# Patient Record
Sex: MALE | Race: WHITE
[De-identification: names, ages, dates, MRNs, and addresses within clinical notes are randomized per-mention and may not be internally consistent; named-entity substitution may affect disease eponyms.]

---

## 2018-06-13 ENCOUNTER — HOSPITAL ENCOUNTER (OUTPATIENT)
Dept: HOSPITAL 5 - GIO | Age: 73
Discharge: HOME | End: 2018-06-13
Attending: INTERNAL MEDICINE
Payer: MEDICARE

## 2018-06-13 VITALS — DIASTOLIC BLOOD PRESSURE: 67 MMHG | SYSTOLIC BLOOD PRESSURE: 127 MMHG

## 2018-06-13 DIAGNOSIS — Z99.89: ICD-10-CM

## 2018-06-13 DIAGNOSIS — I10: ICD-10-CM

## 2018-06-13 DIAGNOSIS — B96.81: ICD-10-CM

## 2018-06-13 DIAGNOSIS — Z85.528: ICD-10-CM

## 2018-06-13 DIAGNOSIS — Z90.5: ICD-10-CM

## 2018-06-13 DIAGNOSIS — K31.7: Primary | ICD-10-CM

## 2018-06-13 DIAGNOSIS — I25.10: ICD-10-CM

## 2018-06-13 DIAGNOSIS — K76.0: ICD-10-CM

## 2018-06-13 PROCEDURE — 43251 EGD REMOVE LESION SNARE: CPT

## 2018-06-13 PROCEDURE — 88342 IMHCHEM/IMCYTCHM 1ST ANTB: CPT

## 2018-06-13 PROCEDURE — 88305 TISSUE EXAM BY PATHOLOGIST: CPT

## 2018-06-13 NOTE — ANESTHESIA CONSULTATION
Anesthesia Consult and Med Hx


Date of service: 06/13/18





- Airway


Anesthetic Teeth Evaluation: Good


ROM Head & Neck: Adequate


Mental/Hyoid Distance: Adequate


Mallampati Class: Class III


Intubation Access Assessment: Possibly Difficult





- Pre-Operative Health Status


ASA Pre-Surgery Classification: ASA3


Proposed Anesthetic Plan: MAC





- Pulmonary


Hx Sleep Apnea: Yes (uses CPAP)





- Cardiovascular System


Hx Hypertension: Yes


Hx Coronary Artery Disease: No (high cholesterol)





- Endocrine


Hx Renal Disease: Yes (s/p right kidney resection for CA)

## 2018-06-13 NOTE — OPERATIVE REPORT
Operative Report


Operative Report: 


Esophagogastroduodenoscopy Procedure Note with Snare Polypectomy





Date of procedure: 6/13/2018





Endoscopist: Tommy Raza





Pre-op diagnosis: Gastric polyps





Post-op diagnosis: multiple gastric polyps in antrum removed 





Anesthesia: MAC





Complications: No immediate complications





Estimated blood loss: minimal





Procedure:  After consent was obtained, the patient was placed in the left 

lateral decubitus position.  The fujinon endoscope was inserted into the patient

's mouth under direct vision, and advanced to the 2nd portion of duodenum 

without difficulty.  The patient tolerated the procedure well.  The views of 

the mucosa were good.  Patient's vital signs were monitored continuously 

throughout the procedure.





Findings:





The esophagus appeared normal.





There were multiple gastric polyps in the antrum of the stomach.  The polyps 

ranged in size from 5-12 mm.  The polyps were removed with hot snare polypectomy

; larger polyps were not retrieved however (prior biopsies showed hyperplastic 

polyps)





The duodenum appeared normal.





Impression:


1. Multiple gastric polyps removed with hot snare polypectomy





Recommendations:


-follow-up pathology


-surveillance EGD in 6-12 months


-return to GI clinic as scheduled


-avoid NSAID medications x 7 days

## 2018-06-13 NOTE — POST OPERATIVE NOTE
Pre-op diagnosis: gastric polyps


Post-op diagnosis: same


Findings: 


Multiple gastric polyps (known) varying in size from 4 mm to 1 cm removed with 

hot snare polypectomy





Procedure: 


EGD with snare polypectomy





Anesthesia: MAC


Surgeon: MINA SANTIZO


Estimated blood loss: minimal


Pathology: list (Gastric polyp)


Specimen disposition: to lab


Condition: stable


Disposition: same day

## 2019-06-01 ENCOUNTER — HOSPITAL ENCOUNTER (EMERGENCY)
Dept: HOSPITAL 5 - ED | Age: 74
Discharge: HOME | End: 2019-06-01
Payer: MEDICARE

## 2019-06-01 VITALS — DIASTOLIC BLOOD PRESSURE: 85 MMHG | SYSTOLIC BLOOD PRESSURE: 185 MMHG

## 2019-06-01 DIAGNOSIS — R33.9: Primary | ICD-10-CM

## 2019-06-01 DIAGNOSIS — E87.6: ICD-10-CM

## 2019-06-01 DIAGNOSIS — I10: ICD-10-CM

## 2019-06-01 DIAGNOSIS — E83.42: ICD-10-CM

## 2019-06-01 LAB
BACTERIA #/AREA URNS HPF: (no result) /HPF
BILIRUB UR QL STRIP: (no result)
BLOOD UR QL VISUAL: (no result)
BUN SERPL-MCNC: 23 MG/DL (ref 9–20)
BUN/CREAT SERPL: 18 %
CALCIUM SERPL-MCNC: 9.3 MG/DL (ref 8.4–10.2)
HEMOLYSIS INDEX: 31
PH UR STRIP: 5 [PH] (ref 5–7)
PROT UR STRIP-MCNC: (no result) MG/DL
RBC #/AREA URNS HPF: 11 /HPF (ref 0–6)
UROBILINOGEN UR-MCNC: < 2 MG/DL (ref ?–2)
WBC #/AREA URNS HPF: 68 /HPF (ref 0–6)

## 2019-06-01 PROCEDURE — 96365 THER/PROPH/DIAG IV INF INIT: CPT

## 2019-06-01 PROCEDURE — 87086 URINE CULTURE/COLONY COUNT: CPT

## 2019-06-01 PROCEDURE — 93005 ELECTROCARDIOGRAM TRACING: CPT

## 2019-06-01 PROCEDURE — 36415 COLL VENOUS BLD VENIPUNCTURE: CPT

## 2019-06-01 PROCEDURE — 96367 TX/PROPH/DG ADDL SEQ IV INF: CPT

## 2019-06-01 PROCEDURE — 93010 ELECTROCARDIOGRAM REPORT: CPT

## 2019-06-01 PROCEDURE — 83735 ASSAY OF MAGNESIUM: CPT

## 2019-06-01 PROCEDURE — 51702 INSERT TEMP BLADDER CATH: CPT

## 2019-06-01 PROCEDURE — 81001 URINALYSIS AUTO W/SCOPE: CPT

## 2019-06-01 PROCEDURE — 80048 BASIC METABOLIC PNL TOTAL CA: CPT

## 2019-06-01 PROCEDURE — 82550 ASSAY OF CK (CPK): CPT

## 2019-06-01 PROCEDURE — 99284 EMERGENCY DEPT VISIT MOD MDM: CPT

## 2019-06-01 NOTE — EMERGENCY DEPARTMENT REPORT
ED Male  HPI





- General


Chief complaint: Urogenital-Male


Stated complaint: CATHETER OUT/PAIN


Time Seen by Provider: 06/01/19 10:17


Source: patient, family, RN notes reviewed


Mode of arrival: Ambulatory


Limitations: Language Barrier





- History of Present Illness


Initial comments: 





The patient is able to converse in English, and has given oral consent for his 

daughter to translate in Armenian as well.





THis provider is conversational in Armenian





This is a 74-year-old gentleman who presents to the emergency room with 

inability to urinate.  The patient reports having had a left-sided "kidney 

ablation", for questionable kidney cancer, at [facility] last month.





He reportedly was not able to urinate thereafter, and had a Martin catheter 

placed.





His private urologist is Dr. MARGARET Wooten





He reports the Martin catheter came out on Wednesday, and he was able to urinate 

intermittently since Wednesday, up until last night, when he then became 

obstructed.  He presents with a primary complaint of inability to urinate.  The 

symptoms are constant.  They are associated with lower abdominal pain.  He's not

had a fever that he is aware of, denies vomiting, endorses testicular pressure, 

and denies upper abdominal pain.


MD Complaint: other


-: Sudden


Location: abdomen (suprapubic region)


Radiation: none


Severity: moderate


Quality: aching


Consistency: constant


Improves with: rest


Worsens with: palpation, movement





- Related Data


                                  Previous Rx's











 Medication  Instructions  Recorded  Last Taken  Type


 


Magnesium Oxide 500 mg PO BID #30 capsule 06/01/19 Unknown Rx


 


Nitrofurantoin Mono/M-Cryst 100 mg PO Q12HR #14 capsule 06/01/19 Unknown Rx





[Macrobid CAP]    


 


Potassium Chloride 20 meq PO BID #30 packet 06/01/19 Unknown Rx











                                    Allergies











Allergy/AdvReac Type Severity Reaction Status Date / Time


 


No Known Allergies Allergy   Verified 06/13/18 08:23














ED Review of Systems


ROS: 


Stated complaint: CATHETER OUT/PAIN


Other details as noted in HPI





Constitutional: denies: fever


Eyes: denies: eye discharge


ENT: denies: epistaxis


Respiratory: denies: cough


Cardiovascular: denies: chest pain


Gastrointestinal: abdominal pain


Genitourinary: other


Musculoskeletal: arthralgia


Neurological: denies: weakness





ED Past Medical Hx





- Past Medical History


Previous Medical History?: Yes


Hx Hypertension: Yes


Hx Renal Disease: Yes (s/p right kidney resection for CA)





- Surgical History


Past Surgical History?: No





- Social History


Smoking Status: Never Smoker


Substance Use Type: None





- Medications


Home Medications: 


                                Home Medications











 Medication  Instructions  Recorded  Confirmed  Last Taken  Type


 


Magnesium Oxide 500 mg PO BID #30 capsule 06/01/19  Unknown Rx


 


Nitrofurantoin Mono/M-Cryst 100 mg PO Q12HR #14 capsule 06/01/19  Unknown Rx





[Macrobid CAP]     


 


Potassium Chloride 20 meq PO BID #30 packet 06/01/19  Unknown Rx














ED Physical Exam





- General


Limitations: Language Barrier


General appearance: alert, in no apparent distress





- Head


Head exam: Present: atraumatic, normocephalic





- Eye


Eye exam: Present: normal appearance, EOMI.  Absent: nystagmus





- ENT


ENT exam: Present: normal exam, normal orophraynx, mucous membranes moist, 

normal external ear exam





- Neck


Neck exam: Present: normal inspection, full ROM.  Absent: tenderness, 

meningismus





- Respiratory


Respiratory exam: Present: normal lung sounds bilaterally.  Absent: respiratory 

distress





- Cardiovascular


Cardiovascular Exam: Present: regular rate, normal rhythm, normal heart sounds. 

Absent: bradycardia, tachycardia, irregular rhythm, systolic murmur, diastolic 

murmur, rubs, gallop





- GI/Abdominal


GI/Abdominal exam: Present: soft, distended, tenderness, other (there is 

suprapubic tenderness.  There is no rebound, guarding or peritoneal signs.).  

Absent: guarding, rebound, rigid, pulsatile mass





- Rectal


Rectal exam: Present: deferred





- 


 exam: Present: normal inspection, other (there is no testicular tenderness.  

There is normal testicular lie bilaterally.  There is normal cremasteric reflex 

bilaterally.).  Absent: testicular tenderness


External exam: Present: normal external exam, other (chaperoned by nurse Hameed)





- Extremities Exam


Extremities exam: Present: normal inspection, full ROM, other (2+ pulses noted 

in the bilateral upper, lower extremities.  Compartments soft.  No long bony 

tenderness.  The pelvis is stable.).  Absent: pedal edema, joint swelling, calf 

tenderness





- Back Exam


Back exam: Present: normal inspection, full ROM.  Absent: tenderness, CVA 

tenderness (R), CVA tenderness (L), paraspinal tenderness, vertebral tenderness





- Neurological Exam


Neurological exam: Present: alert, normal gait, other (Extraocular movements 

intact.  Tongue midline.  No facial droop.  Facial sensation intact to light 

touch in the V1, V2, V3 distribution bilaterally.  5 and 5 strength in 4 

extremities..  Sensation is intact to light touch in 4 extremities.)





- Psychiatric


Psychiatric exam: Present: normal affect, normal mood





- Skin


Skin exam: Present: warm, dry, intact, normal color.  Absent: rash





ED Course


                                   Vital Signs











  06/01/19 06/01/19 06/01/19





  09:22 11:00 11:54


 


Temperature 97.7 F  


 


Pulse Rate 85  63


 


Respiratory 18 18 18





Rate   


 


Blood Pressure 159/73  


 


Blood Pressure   130/65





[Right]   


 


O2 Sat by Pulse 97  100





Oximetry   














- Reevaluation(s)


Reevaluation #1: 





06/01/19 10:51


Differential diagnosis, including not limited to: Obstructive uropathy, BPH, 

prostate cancer, urinary tract infection, electrolyte derangement





Assessment and plan: 74-year-old gentleman with urinary obstruction.  He is 

afebrile with reassuring vital signs.  We will place Martin catheter with a leg 

bag.  We will obtain urinalysis and urine culture.  We will obtain screening 

laboratory studies.  We will reassess.


Reevaluation #2: 





06/01/19 12:31


Patient feels improved after Martin catheter.  As per verbal report from nursing 

team, initially had 600 mL of clear yellow urine returned from the Martin 

catheter after placement.  An additional 400 mL has come out.





Urinalysis suggests urinary tract infection.  Also found to be hypokalemic and 

hypomagnesemic.





Medical records reviewed apparently, recently had cryoablation of left renal 

mass on 04/26/2019.





Medical records were seen and reviewed.





Patient apparently had cryoablation of left renal cell carcinoma with CT and 

ultrasound guidance.





Currently, and in no acute distress.  After his ablation procedure was 

performed, a CT scan was performed, and it showed the right kidney to be without

 evidence of hydronephrosis, and post-ablation changes were noted to the left 

lower pole of the kidney with an ablation zone noted.





No metastatic disease was noted in the abdomen.





Patient can be discharged with leg bag, potassium and magnesium supplementation,

 and instructions to follow up with his outpatient primary care doctor, and 

urology specialist.





ED Medical Decision Making





- Lab Data


Result diagrams: 


                                 06/01/19 10:43








                                   Vital Signs











  06/01/19 06/01/19 06/01/19





  09:22 11:00 11:54


 


Temperature 97.7 F  


 


Pulse Rate 85  63


 


Respiratory 18 18 18





Rate   


 


Blood Pressure 159/73  


 


Blood Pressure   130/65





[Right]   


 


O2 Sat by Pulse 97  100





Oximetry   











                                   Lab Results











  06/01/19 06/01/19 Range/Units





  10:43 Unknown 


 


Sodium  137   (137-145)  mmol/L


 


Potassium  3.1 L   (3.6-5.0)  mmol/L


 


Chloride  92.6 L   ()  mmol/L


 


Carbon Dioxide  26   (22-30)  mmol/L


 


Anion Gap  22   mmol/L


 


BUN  23 H   (9-20)  mg/dL


 


Creatinine  1.3   (0.8-1.5)  mg/dL


 


Estimated GFR  54   ml/min


 


BUN/Creatinine Ratio  18   %


 


Glucose  174 H   ()  mg/dL


 


Calcium  9.3   (8.4-10.2)  mg/dL


 


Magnesium  1.60 L   (1.7-2.3)  mg/dL


 


Total Creatine Kinase  175 H   ()  units/L


 


Urine Color   Yellow  (Yellow)  


 


Urine Turbidity   Slightly-cloudy  (Clear)  


 


Urine pH   5.0  (5.0-7.0)  


 


Ur Specific Gravity   1.011  (1.003-1.030)  


 


Urine Protein   <15 mg/dl  (Negative)  mg/dL


 


Urine Glucose (UA)   Neg  (Negative)  mg/dL


 


Urine Ketones   Neg  (Negative)  mg/dL


 


Urine Blood   Sm  (Negative)  


 


Urine Nitrite   Neg  (Negative)  


 


Urine Bilirubin   Neg  (Negative)  


 


Urine Urobilinogen   < 2.0  (<2.0)  mg/dL


 


Ur Leukocyte Esterase   Lg  (Negative)  


 


Urine WBC (Auto)   68.0 H  (0.0-6.0)  /HPF


 


Urine RBC (Auto)   11.0  (0.0-6.0)  /HPF


 


Urine Bacteria (Auto)   1+  (Negative)  /HPF














- EKG Data


-: EKG Interpreted by Me


EKG shows normal: sinus rhythm


Rate: normal





- EKG Data


When compared to previous EKG there are: previous EKG unavailable





06/01/19 12:34


EKG shows a sinus rhythm, 74 bpm, normal axis, QTC prolonged, IA interval 

prolonged, low voltage, no endorsement of chest pain, abnormal EKG, not 

consistent with ST elevation myocardial infarction.





- Radiology Data


Radiology results: report reviewed


Critical care attestation.: 


If time is entered above; I have spent that time in minutes in the direct care 

of this critically ill patient, excluding procedure time.








ED Disposition


Clinical Impression: 


 Urinary retention, Hypokalemia, Hypomagnesemia





Disposition: DC-01 TO HOME OR SELFCARE


Is pt being admited?: No


Does the pt Need Aspirin: No


Condition: Stable


Additional Instructions: 


Cultures were sent today, and results will be available in the next 3-5 days.  

Please have your primary care doctor or urology specialist contact the medical 

records department to obtain culture results.





Keep the Martin catheter in place, attached to a leg bag, and please follow-up 

with your urology specialist within the next 7 days for repeat evaluation, and 

trial of void.





Take potassium and magnesium supplementation as directed.  Will up with her 

primary care doctor within the next 3-4 weeks to have potassium and magnesium 

levels rechecked.





Return to the emergency room right away with new, worsening or different 

symptoms, or symptoms not present on the initial ER evaluation.











Las culturas se enviaron hoy, y los resultados estarn disponibles en los 

prximos 3-5 mckeon. Solicite a ortiz mdico de atencin primaria o especialista en 

urologa que se comunique con el departamento de registros mdicos para obtener 

resultados de cultivo.





Mantenga el catter de Martin en ortiz lugar, sujeto a antonio bolsa para la pierna, y 

tin un seguimiento con ortiz especialista en urologa dentro de los prximos 7 

mckeon para repetir la evaluacin y probar el vaco.





[place] suplementos de potasio y magnesio segn las indicaciones. Se pondr de 

acuerdo con ortiz mdico de atencin primaria en las prximas 3 a 4 semanas para 

que se vuelvan a controlar los niveles de potasio y magnesio.





Regrese a la hemanth de emergencias de inmediato con sntomas nuevos, que empeoran 

o diferentes, o sntomas que no se presentan en la evaluacin inicial de la hemanth

de emergencias.


Referrals: 


TAYE SOFIA MD [Staff Physician] - 7-10 days

## 2020-06-29 ENCOUNTER — HOSPITAL ENCOUNTER (EMERGENCY)
Dept: HOSPITAL 5 - ED | Age: 75
LOS: 1 days | Discharge: LEFT BEFORE DISCHARGE | End: 2020-06-30
Payer: MEDICARE

## 2020-06-29 VITALS — SYSTOLIC BLOOD PRESSURE: 143 MMHG | DIASTOLIC BLOOD PRESSURE: 65 MMHG

## 2020-06-29 DIAGNOSIS — Z85.46: ICD-10-CM

## 2020-06-29 DIAGNOSIS — Z79.899: ICD-10-CM

## 2020-06-29 DIAGNOSIS — I10: ICD-10-CM

## 2020-06-29 DIAGNOSIS — M13.80: ICD-10-CM

## 2020-06-29 DIAGNOSIS — Z85.528: ICD-10-CM

## 2020-06-29 DIAGNOSIS — E87.6: Primary | ICD-10-CM

## 2020-06-29 LAB
ALBUMIN SERPL-MCNC: 4.2 G/DL (ref 3.9–5)
ALT SERPL-CCNC: 51 UNITS/L (ref 7–56)
BASOPHILS # (AUTO): 0 K/MM3 (ref 0–0.1)
BASOPHILS NFR BLD AUTO: 0.8 % (ref 0–1.8)
BUN SERPL-MCNC: 23 MG/DL (ref 9–20)
BUN/CREAT SERPL: 18 %
CALCIUM SERPL-MCNC: 9.3 MG/DL (ref 8.4–10.2)
EOSINOPHIL # BLD AUTO: 0 K/MM3 (ref 0–0.4)
EOSINOPHIL NFR BLD AUTO: 0.1 % (ref 0–4.3)
HCT VFR BLD CALC: 34.6 % (ref 35.5–45.6)
HEMOLYSIS INDEX: 3
HGB BLD-MCNC: 11.6 GM/DL (ref 11.8–15.2)
LYMPHOCYTES # BLD AUTO: 0.9 K/MM3 (ref 1.2–5.4)
LYMPHOCYTES NFR BLD AUTO: 16 % (ref 13.4–35)
MCHC RBC AUTO-ENTMCNC: 34 % (ref 32–34)
MCV RBC AUTO: 80 FL (ref 84–94)
MONOCYTES # (AUTO): 0.4 K/MM3 (ref 0–0.8)
MONOCYTES % (AUTO): 6.7 % (ref 0–7.3)
PLATELET # BLD: 172 K/MM3 (ref 140–440)
RBC # BLD AUTO: 4.31 M/MM3 (ref 3.65–5.03)

## 2020-06-29 PROCEDURE — 71046 X-RAY EXAM CHEST 2 VIEWS: CPT

## 2020-06-29 PROCEDURE — 36415 COLL VENOUS BLD VENIPUNCTURE: CPT

## 2020-06-29 PROCEDURE — 85025 COMPLETE CBC W/AUTO DIFF WBC: CPT

## 2020-06-29 PROCEDURE — 80053 COMPREHEN METABOLIC PANEL: CPT

## 2020-06-29 PROCEDURE — 82550 ASSAY OF CK (CPK): CPT

## 2020-06-29 PROCEDURE — 83735 ASSAY OF MAGNESIUM: CPT

## 2020-06-29 PROCEDURE — 84484 ASSAY OF TROPONIN QUANT: CPT

## 2020-06-29 NOTE — XRAY REPORT
CHEST 2 VIEWS [id number]



INDICATION / CLINICAL INFORMATION: MAIN



COMPARISON: None available.



FINDINGS:



SUPPORT DEVICES: None.



HEART / MEDIASTINUM: No significant abnormality. 



LUNGS / PLEURA: Bibasilar increased markings are seen, more on the right. This could represent a comb
ination of atelectatic change and possible pneumonic infiltrate, particularly in the right base. No d
ense areas of consolidation are seen however. No pleural effusions are noted. No pneumothorax. 



ADDITIONAL FINDINGS: No significant additional findings.



IMPRESSION: Basilar increased markings as above. Recommend clinical correlation and follow-up.



Signer Name: Darryl Martinez MD 

Signed: 6/29/2020 9:59 PM

Workstation Name: [email or web address]

## 2020-06-29 NOTE — EMERGENCY DEPARTMENT REPORT
ED General Adult HPI





- General


Chief complaint: Dyspnea/Respdistress


Stated complaint: DIFF BREATHING


PUI?: Yes


Time Seen by Provider: 20 22:52


Source: patient, RN notes reviewed, old records reviewed


Mode of arrival: Ambulatory


Limitations: No Limitations





- History of Present Illness


Initial comments: 





The patient is a 75-year-old gentleman.  I have evaluated this gentleman in the 

past.





The patient is able to speak in English to myself.  The patient presents to the 

ER with a complaint of shortness of breath for 5 years.  He reports new onset 

fatigue which started 3 to 4 days ago.  This is accompanied by a change in his 

taste.  He denies physical pain.  Denies headache, neck pain, chest pain, 

abdominal pain, irritative/obstructive urinary symptoms.  Positive cough.  No 

body aches.  Positive decrease in exercise tolerance.  Reports no COVID exposure

that he is aware of.  Symptoms present for the past 3 to 4 days, worsened with 

physical exertion and decreased with rest.





Please note that during the entire history and physical examination, I had on 

complete personal protective equipment


-: Gradual, days(s)


Consistency: constant


Improves with: rest


Worsens with: movement





- Related Data


                                  Previous Rx's











 Medication  Instructions  Recorded  Last Taken  Type


 


Acetaminophen [Non-Aspirin Extra 500 mg PO Q6HR PRN #30 tablet 20 Unknown 

Rx





Strength]    


 


Albuterol Sulfate [Proair 90 mcg IH Q4HR PRN #2 aer.pow.ba 20 Unknown Rx





Respiclick]    


 


Amoxicillin [Trimox CAP] 1,000 mg PO Q8H #28 capsule 20 Unknown Rx


 


Azithromycin [Zithromax TAB] 250 mg PO QDAY #4 tablet 20 Unknown Rx


 


Potassium Chloride 20 meq PO BID #30 packet 20 Unknown Rx











                                    Allergies











Allergy/AdvReac Type Severity Reaction Status Date / Time


 


No Known Allergies Allergy   Verified 18 08:23














ED Review of Systems


ROS: 


Stated complaint: DIFF BREATHING


Other details as noted in HPI





Constitutional: denies: fever


Eyes: denies: eye discharge


ENT: congestion


Respiratory: cough, shortness of breath


Cardiovascular: denies: chest pain


Gastrointestinal: denies: abdominal pain


Genitourinary: denies: dysuria


Musculoskeletal: denies: back pain


Neurological: weakness





ED Past Medical Hx





- Past Medical History


Hx Hypertension: Yes


Hx Renal Disease: Yes (s/p right kidney resection for CA)


Hx of Cancer: Yes (prostate and kidney)


Hx Arthritis: Yes





- Surgical History


Past Surgical History?: Yes


Additional Surgical History: abd





- Social History


Smoking Status: Never Smoker


Substance Use Type: None





- Medications


Home Medications: 


                                Home Medications











 Medication  Instructions  Recorded  Confirmed  Last Taken  Type


 


Acetaminophen [Non-Aspirin Extra 500 mg PO Q6HR PRN #30 tablet 20  Unknown

 Rx





Strength]     


 


Albuterol Sulfate [Proair 90 mcg IH Q4HR PRN #2 aer.pow.ba 20  Unknown Rx





Respiclick]     


 


Amoxicillin [Trimox CAP] 1,000 mg PO Q8H #28 capsule 20  Unknown Rx


 


Azithromycin [Zithromax TAB] 250 mg PO QDAY #4 tablet 20  Unknown Rx


 


Potassium Chloride 20 meq PO BID #30 packet 20  Unknown Rx














ED Physical Exam





- General


Limitations: No Limitations


General appearance: alert, in no apparent distress





- Head


Head exam: Present: atraumatic, normocephalic





- Eye


Eye exam: Present: normal appearance, EOMI.  Absent: nystagmus





- ENT


ENT exam: Present: normal exam, normal orophraynx, mucous membranes moist, 

normal external ear exam





- Neck


Neck exam: Present: normal inspection, full ROM.  Absent: tenderness, 

meningismus





- Respiratory


Respiratory exam: Present: decreased breath sounds.  Absent: respiratory 

distress, wheezes, rales, rhonchi, stridor





- Cardiovascular


Cardiovascular Exam: Present: regular rate, normal rhythm, normal heart sounds. 

Absent: bradycardia, tachycardia, irregular rhythm, systolic murmur, diastolic 

murmur, rubs, gallop





- GI/Abdominal


GI/Abdominal exam: Present: soft, normal bowel sounds.  Absent: distended, 

tenderness, guarding, rebound, rigid, pulsatile mass





- Rectal


Rectal exam: Present: deferred





- Extremities Exam


Extremities exam: Present: normal inspection (Right upper extremity shows 

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 

noted in the bilateral upper and lower extremities.  There is no palpable cord. 

 negative Homans sign.  Muscular compartments are soft.  The pelvis is stable.).

 Absent: pedal edema, calf tenderness





- Back Exam


Back exam: Present: normal inspection, full ROM.  Absent: tenderness, CVA 

tenderness (R), CVA tenderness (L), paraspinal tenderness, vertebral tenderness





- Neurological Exam


Neurological exam: Present: alert, normal gait, other (2+ pulses noted in the 

bilateral upper and lower extremities.  There is no palpable cord.   negative 

Homans sign.  Muscular compartments are soft.  The pelvis is stable.).  Absent: 

motor sensory deficit





- Psychiatric


Psychiatric exam: Present: normal affect, normal mood





- Skin


Skin exam: Present: warm, dry, intact, normal color.  Absent: rash





ED Course


                                   Vital Signs











  20





  20:25


 


Temperature 99.1 F


 


Pulse Rate 76


 


Respiratory 18





Rate 


 


Blood Pressure 143/65


 


O2 Sat by Pulse 97





Oximetry 














ED Medical Decision Making





- Lab Data


Result diagrams: 


                                 20 21:18





                                 20 21:18








                                   Vital Signs











  20





  20:25


 


Temperature 99.1 F


 


Pulse Rate 76


 


Respiratory 18





Rate 


 


Blood Pressure 143/65


 


O2 Sat by Pulse 97





Oximetry 











                                   Lab Results











  20 Range/Units





  21:18 21:18 21:18 


 


WBC  5.9    (4.5-11.0)  K/mm3


 


RBC  4.31    (3.65-5.03)  M/mm3


 


Hgb  11.6 L    (11.8-15.2)  gm/dl


 


Hct  34.6 L    (35.5-45.6)  %


 


MCV  80 L    (84-94)  fl


 


MCH  27 L    (28-32)  pg


 


MCHC  34    (32-34)  %


 


RDW  15.4 H    (13.2-15.2)  %


 


Plt Count  172    (140-440)  K/mm3


 


Lymph % (Auto)  16.0    (13.4-35.0)  %


 


Mono % (Auto)  6.7    (0.0-7.3)  %


 


Eos % (Auto)  0.1    (0.0-4.3)  %


 


Baso % (Auto)  0.8    (0.0-1.8)  %


 


Lymph #  0.9 L    (1.2-5.4)  K/mm3


 


Mono #  0.4    (0.0-0.8)  K/mm3


 


Eos #  0.0    (0.0-0.4)  K/mm3


 


Baso #  0.0    (0.0-0.1)  K/mm3


 


Seg Neutrophils %  76.4 H    (40.0-70.0)  %


 


Seg Neutrophils #  4.5    (1.8-7.7)  K/mm3


 


Sodium   138   (137-145)  mmol/L


 


Potassium   2.9 L*   (3.6-5.0)  mmol/L


 


Chloride   91.8 L   ()  mmol/L


 


Carbon Dioxide   28   (22-30)  mmol/L


 


Anion Gap   21   mmol/L


 


BUN   23 H   (9-20)  mg/dL


 


Creatinine   1.3   (0.8-1.5)  mg/dL


 


Estimated GFR   54   ml/min


 


BUN/Creatinine Ratio   18   %


 


Glucose   128 H   ()  mg/dL


 


Calcium   9.3   (8.4-10.2)  mg/dL


 


Total Bilirubin   0.80   (0.1-1.2)  mg/dL


 


AST   35   (5-40)  units/L


 


ALT   51   (7-56)  units/L


 


Alkaline Phosphatase   91   ()  units/L


 


Troponin T    < 0.010  (0.00-0.029)  ng/mL


 


Total Protein   7.9   (6.3-8.2)  g/dL


 


Albumin   4.2   (3.9-5)  g/dL


 


Albumin/Globulin Ratio   1.1   %














- Radiology Data


Radiology results: report reviewed, image reviewed





Print Report











Referring Physician: YAW FINK 


 


Patient Name: BRENDA LANGLEY 


 


Patient ID: [id number] 


 


YOB: 1945 


 


Sex: Male 


 


Accession: [id number] 


 


Report Date: 2020 


 


Report Status: Finalized 


 


Findings


[facility] [address] [place] [address] 

XRay Report Signed Patient: BRENDA HANSEN MR#: [id number] : 

1945 Acct:[id number] Age/Sex: 75 / M ADM Date: 20 Loc: ED 

Attending Dr: Ordering Physician: LEANNA SIMMS Date of Service: 

20 Procedure(s): XR chest routine 2V Accession Number(s): [id number] cc: 

LEANNA SIMMS Fluoro Time In Minutes: CHEST 2 VIEWS 2143 INDICATION / 

CLINICAL INFORMATION: MAIN COMPARISON: None available. FINDINGS: SUPPORT 

DEVICES: None. HEART / MEDIASTINUM: No significant abnormality. LUNGS / PLEURA: 

Bibasilar increased markings are seen, more on the right. This could represent a

combination of atelectatic change and possible pneumonic infiltrate, 

particularly in the right base. No dense areas of consolidation are seen 

however. No pleural effusions are noted. No pneumothorax. ADDITIONAL FINDINGS: 

No significant additional findings. IMPRESSION: Basilar increased markings as ab

ove. Recommend clinical correlation and follow-up. Signer Name: Darryl Martinez MD Signed: 2020 9:59 PM Workstation Name: [id number] Transcribed By: SHAQUILLE 

Dictated By: Darryl Martinez MD Electronically Authenticated By: Darryl Martinez MD Signed Date/Time: 20 DD/DT: 20 TD/TT:   














- Medical Decision Making





Differential diagnosis, including but not limited to: Viral syndrome, pneumonia,

electrolyte derangement





Assessment and plan: 75-year-old gentleman, who is afebrile with reassuring 

vital signs, with nonspecific constitutional symptoms, suspicious for 

coronavirus.





The patient and I ambulated on room oxygen, on a portable pulse oximeter, for 5 

minutes.  He desaturated to 93/94%.  He was visibly winded and short of breath. 

I recommended admission to the hospital for supportive care.  The patient is 

declining/refusing.  Explained the risks of leaving AGAINST MEDICAL ADVICE, 

including death, disability, paralysis, loss of quality of life.  Explained this

in both English and Cuban.  The patient verbalized understanding.  The patient

is alert and oriented, clinically sober, and exhibits decision-making capacity 

at this time.  He is free from distracting injury at this time.  He is amenable 

to oral antibiotics and potassium supplementation.  We will discharge him with 

the aforementioned as well.  Explained to the patient that he should follow-up 

as soon as possible with an outpatient primary care doctor.  Explained to the 

patient that he may return to the emergency room right away if and when he 

changes his mind.  The patient's primary reason for not wanting to be admitted 

to the hospital is that he does not want to be exposed to coronavirus patients 

while in the hospital.  Explained to the patient that he likely has coronavirus.

 The patient is still adamant about this decision making


Critical Care Time: Yes


Critical care time in (mins) excluding proc time.: 35


Critical care attestation.: 


If time is entered above; I have spent that time in minutes in the direct care 

of this critically ill patient, excluding procedure time.








ED Disposition


Clinical Impression: 


 Suspected 2019 novel coronavirus infection, Hypokalemia





Disposition: DC-07 LEFT AGAINST MED ADVICE


Is pt being admited?: No


Does the pt Need Aspirin: No


Condition: Undetermined


Additional Instructions: 


As we discussed, you have left the hospital/emergency room AGAINST MEDICAL 

ADVICE.  By leaving, you risked death, disability, paralysis, permanent loss of 

quality of life.  The ER is open 24 hours a day, 7 days a week.  It never 

closes.  Please return to the emergency room right away if and when you change 

your mind.  If you decide not to return to the emergency room, please follow-up 

with the listed physician referrals as soon as possible.





Take the medications as prescribed and directed.  Avoid exposure to the very 

young, very elderly, and those with chronic medical conditions.  Please make 

certain to self isolate and self quarantine.  Make certain to wash hands 

frequently, thoroughly and often.  Wear a mask when in close proximity to other 

human beings.





Umu discutimos, usted ha abandonado el hospital / hemanth de emergencias CONTRA EL

ASESORAMIENTO MDICO. Al irse, corra el riesgo de muerte, discapacidad, 

parlisis, prdida permanente de la calidad de edwar. La hemanth de emergencias est

abierta las 24 horas del da, los 7 mckeon de la semana. Nunca se radha. Regrese 

a la hemanth de emergencias de inmediato si cambia de opinin y cuando lo tin. Si 

decide no regresar a la hemanth de emergencias, realice un seguimiento con las 

derivaciones mdicas enumeradas lo antes posible.





[place] los medicamentos segn lo prescrito y lo indicado. Evite la exposicin a 

los muy jvenes, muy ancianos y aquellos con afecciones mdicas crnicas. 

Asegrese de aislarse y ponerse en cuarentena. Asegrese de lavarse las kristy 

con frecuencia, a fondo y con frecuencia. Use antonio mscara cuando est cerca de 

otros seres humanos.


Referrals: 


JESUS GRIDER MD [Staff Physician] - 3-5 Days


[facility] [Provider Group] - 3-5 Days